# Patient Record
Sex: FEMALE | Race: WHITE | Employment: PART TIME | URBAN - METROPOLITAN AREA
[De-identification: names, ages, dates, MRNs, and addresses within clinical notes are randomized per-mention and may not be internally consistent; named-entity substitution may affect disease eponyms.]

---

## 2019-11-01 ENCOUNTER — HOSPITAL ENCOUNTER (EMERGENCY)
Facility: HOSPITAL | Age: 49
Discharge: HOME | End: 2019-11-01
Attending: EMERGENCY MEDICINE
Payer: MEDICAID

## 2019-11-01 ENCOUNTER — APPOINTMENT (OUTPATIENT)
Dept: GENERAL RADIOLOGY | Facility: HOSPITAL | Age: 49
End: 2019-11-01
Attending: EMERGENCY MEDICINE
Payer: MEDICAID

## 2019-11-01 VITALS
HEART RATE: 80 BPM | BODY MASS INDEX: 28.93 KG/M2 | WEIGHT: 180 LBS | OXYGEN SATURATION: 97 % | RESPIRATION RATE: 18 BRPM | TEMPERATURE: 98 F | HEIGHT: 66 IN | SYSTOLIC BLOOD PRESSURE: 108 MMHG | DIASTOLIC BLOOD PRESSURE: 66 MMHG

## 2019-11-01 DIAGNOSIS — M79.671 RIGHT FOOT PAIN: Primary | ICD-10-CM

## 2019-11-01 PROCEDURE — 73630 X-RAY EXAM OF FOOT: CPT | Performed by: EMERGENCY MEDICINE

## 2019-11-01 PROCEDURE — 99284 EMERGENCY DEPT VISIT MOD MDM: CPT

## 2019-11-01 PROCEDURE — 96372 THER/PROPH/DIAG INJ SC/IM: CPT

## 2019-11-01 RX ORDER — MELOXICAM 7.5 MG/1
7.5 TABLET ORAL DAILY PRN
Qty: 14 TABLET | Refills: 0 | Status: SHIPPED | OUTPATIENT
Start: 2019-11-01 | End: 2019-11-15

## 2019-11-01 RX ORDER — LIDOCAINE 50 MG/G
1 PATCH TOPICAL EVERY 24 HOURS
Qty: 7 PATCH | Refills: 0 | Status: SHIPPED | OUTPATIENT
Start: 2019-11-01 | End: 2019-11-08

## 2019-11-01 RX ORDER — KETOROLAC TROMETHAMINE 30 MG/ML
60 INJECTION, SOLUTION INTRAMUSCULAR; INTRAVENOUS ONCE
Status: COMPLETED | OUTPATIENT
Start: 2019-11-01 | End: 2019-11-01

## 2019-11-01 NOTE — ED INITIAL ASSESSMENT (HPI)
Pt c/o intermittent pain to top of right foot that is getting worst this morning, denies any trauma, no swelling/redness noted, cms intact

## 2019-11-01 NOTE — ED PROVIDER NOTES
Patient Seen in: [facility] AND [facility] Emergency Department    History   Patient presents with:   Foot Pain    Stated Complaint: pain to top of right foot     HPI    54-year-old female with past medical history of depression presenting for evaluation of approxi normal..  Musculoskeletal: No gross deformity. Right dorsal midfoot with mild tenderness without cutaneous/crepitant change and with soft compartments; RLE without ankle/calf tenderness or proximal extremity tenderness. Neurological: Alert.  RLE proximally Prescribed:  Discharge Medication List as of 11/1/2019  6:38 AM    START taking these medications    Meloxicam 7.5 MG Oral Tab  Take 1 tablet (7.5 mg total) by mouth daily as needed for Pain (Take with food.  Do not take with ibuprofen (motrin) or naproxen

## 2019-11-01 NOTE — ED NOTES
Patient c/o of right foot pain that has been intermittent for a year. Patient states the pain today was so bad it woke her up. Patient has a lump to anterior portion of foot.

## 2020-05-17 ENCOUNTER — APPOINTMENT (OUTPATIENT)
Dept: GENERAL RADIOLOGY | Facility: HOSPITAL | Age: 50
DRG: 917 | End: 2020-05-17
Payer: MEDICAID

## 2020-05-17 ENCOUNTER — APPOINTMENT (OUTPATIENT)
Dept: CT IMAGING | Facility: HOSPITAL | Age: 50
DRG: 917 | End: 2020-05-17
Attending: EMERGENCY MEDICINE
Payer: MEDICAID

## 2020-05-17 PROBLEM — F60.3 BORDERLINE PERSONALITY DISORDER (HCC): Status: ACTIVE | Noted: 2020-05-17

## 2020-05-17 PROBLEM — F31.4 SEVERE DEPRESSED BIPOLAR I DISORDER WITHOUT PSYCHOTIC FEATURES (HCC): Status: ACTIVE | Noted: 2020-05-17

## 2020-05-17 PROBLEM — I46.9 CARDIAC ARREST (HCC): Status: ACTIVE | Noted: 2020-05-17

## 2020-05-17 PROBLEM — T50.902A SUICIDAL OVERDOSE, INITIAL ENCOUNTER (HCC): Status: ACTIVE | Noted: 2020-05-17

## 2020-05-17 PROBLEM — J69.0: Status: ACTIVE | Noted: 2020-05-17

## 2020-05-17 PROBLEM — T40.1X1A ACCIDENTAL OVERDOSE OF HEROIN, INITIAL ENCOUNTER (HCC): Status: ACTIVE | Noted: 2020-05-17

## 2020-05-17 PROCEDURE — 70450 CT HEAD/BRAIN W/O DYE: CPT | Performed by: EMERGENCY MEDICINE

## 2020-05-17 PROCEDURE — 71260 CT THORAX DX C+: CPT | Performed by: EMERGENCY MEDICINE

## 2020-05-17 PROCEDURE — 71045 X-RAY EXAM CHEST 1 VIEW: CPT | Performed by: EMERGENCY MEDICINE

## 2020-05-17 NOTE — CONSULTS
Pulmonary/Critical Care Consultation Note    HPI:   Vicki Peralta is a 47year old female with Patient presents with:  Kyle Velazquez    Pt is a 46 yo with h/o depression who states was experimenting with nasal heroin and next she remembers in be TID  clonazePAM (KLONOPIN) tab 0.5 mg, 0.5 mg, Oral, BID PRN  haloperidol lactate (HALDOL) 5 MG/ML injection 1 mg, 1 mg, Intravenous, Q6H PRN  Heparin Sodium (Porcine) 5000 UNIT/ML injection 5,000 Units, 5,000 Units, Subcutaneous, Q8H [address]            Allerg neg  Plan CCU   Tele   ECHO    Pulm  Possible aspiration  Coughed up supraglottic device in route with EMS  CXR b/l GGO Upper > Lower  COVID rapid neg  BNP neg  Plan COVID PCR   Iv abx   F/u in AM    Heroin OD  States was experimenting  Tox + opiate and am

## 2020-05-17 NOTE — ED NOTES
Pt refused straight cath, put on bedpan, which spilled onto chucks. Only enough urine for UA, will notify floor RN still need tox screen. Pericare and bed change done. Pt crying asking to speak with a , Miladis Pichardo MD aware. Crisis called.

## 2020-05-17 NOTE — ED INITIAL ASSESSMENT (HPI)
rosc arrest. One round of cpr. 4 of narcan. Pt had emetic episode pta.  Pt is awake and durham upon arrival.

## 2020-05-17 NOTE — ED PROVIDER NOTES
Patient Seen in: [facility] AND [facility] Emergency Department      History   Patient presents with:  Eval-D    Stated Complaint: ROSC ARREST    HPI  54-year-old female with depression presenting for evaluation after cardiac arrest.  According to her , t HENT:      Head: Normocephalic and atraumatic. Neck:      Musculoskeletal: Normal range of motion and neck supple. No muscular tenderness. Cardiovascular:      Rate and Rhythm: Regular rhythm. Tachycardia present.       Heart sounds: Normal heart soun Notable for the following components:    POC Glucose  277 (*)     All other components within normal limits   CBC W/ DIFFERENTIAL - Abnormal; Notable for the following components:    WBC 13.4 (*)     Lymphocyte Absolute 4.70 (*)     All other components wi signed and verified by the Radiologist whose name is printed above. DD:  05/17/2020/DT:  05/17/2020      CT head without contrast  CT chest with IV contrast    IMPRESSION:    Head:  No acute intracranial abnormality.  No acute territorial infarct, hemorr spent performing procedures.     Admission disposition: 5/17/2020  5:01 AM           Disposition and Plan     Clinical Impression:  Accidental overdose of heroin, initial encounter ([facility] [address]  (primary encounter diagnosis)  Cardiac arrest ([facility] [address]  Aspiration pneum

## 2020-05-17 NOTE — PLAN OF CARE
Ronalmargocatarino was admitted this morning from the ER. She is A& O x4. Throughout the shift she has been very tearful and regretful regarding overdose. Breathing & relaxation techniques/education performed.  Pt has told RN that this was an accidental overdose and s and demonstrate effective coping strategies  Description  INTERVENTIONS:  - Assist patient/family to identify coping skills, available support systems and cultural and spiritual values  - Provide emotional support, including active listening and acknowledg about the behavioral management agreement  - Implement a Health Care Agreement if patient meets criteria  - If a patient’s behavior jeopardizes the safety of the patient, staff, or others refer to organization policy.  If a visitor’s behavior poses a threat community for further support work  Outcome: Progressing  Goal: Pt feels balance and connection with higher power that empowers the self during times of loss, guilt and fear  Description  INTERVENTIONS:  - Create safety for patient through empathic presenc including physical limitations  - Instruct pt to call for assistance with activity based on assessment  - Modify environment to reduce risk of injury  - Provide assistive devices as appropriate  - Consider OT/PT consult to assist with strengthening/mobilit oxygenation  Description  INTERVENTIONS:  - Assess for changes in respiratory status  - Assess for changes in mentation and behavior  - Position to facilitate oxygenation and minimize respiratory effort  - Oxygen supplementation based on oxygen saturation anticipated neutropenic period  Description  INTERVENTIONS  - Monitor WBC  - Administer growth factors as ordered  - Implement neutropenic guidelines  Outcome: Not Progressing

## 2020-05-17 NOTE — ED NOTES
Telephone call received from person identifying himself as the patient's , Shaheen Yassine, [phone], who states that the patient \"became very sleepy then unresponsive\" at 0135 am when he called 911 and was given instructions to perform chest com
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
## 2020-05-17 NOTE — CONSULTS
[facility] - [facility]     [facility] Cardiology Consult Note    Марина Munson Patient Status:  Emergency    1970 MRN [id number]   Location [address] Attending Emily Gaytan MD   Hosp Day # 0 PCP Aleisha Leon CBD, last heroin use prior to this was 7-8 yrs ago, no cocaine, no IVDU    Objective:   Pulse: 88  Resp: 21  BP: 102/67    Intake/Output:     Intake/Output Summary (Last 24 hours) at 5/17/2020 0738  Last data filed at 5/17/2020 9060  Gross per 24 hour   In overdose. Unlikely cardiac component. Initial rhythm PEA; no VT/VT. Trop negative, BNP normal, no ischemic changes on ECG.  Chest pain is MSK and completely reproducible on exam. SOB/hypoxia likely 2/2 aspiration.   - management of aspiration pneumonitis/pn

## 2020-05-17 NOTE — CM/SW NOTE
Received MDO for ETOH/Substance Abuse. Patient admitted following heroin overdose. Psychiatry follow, Psych liaison to assess & provide resources.     Bradley Taylor, [id number]

## 2020-05-17 NOTE — ED NOTES
Pt is intermittently sleeping, occasional cough. Protecting airway. lr infusing. Pt on 6l high-flow nc.

## 2020-05-18 ENCOUNTER — APPOINTMENT (OUTPATIENT)
Dept: CV DIAGNOSTICS | Facility: HOSPITAL | Age: 50
DRG: 917 | End: 2020-05-18
Attending: INTERNAL MEDICINE
Payer: MEDICAID

## 2020-05-18 ENCOUNTER — APPOINTMENT (OUTPATIENT)
Dept: GENERAL RADIOLOGY | Facility: HOSPITAL | Age: 50
DRG: 917 | End: 2020-05-18
Attending: INTERNAL MEDICINE
Payer: MEDICAID

## 2020-05-18 PROCEDURE — 93306 TTE W/DOPPLER COMPLETE: CPT | Performed by: INTERNAL MEDICINE

## 2020-05-18 PROCEDURE — 71045 X-RAY EXAM CHEST 1 VIEW: CPT | Performed by: INTERNAL MEDICINE

## 2020-05-18 NOTE — H&P
[facility]    PATIENT'S NAME: Anum Valencia   ATTENDING PHYSICIAN: Sherrell Joyce MD   PATIENT ACCOUNT#:   [id number]    LOCATION:  [address] [place] [address]. RECORD #:   [id number]       YOB: 1970  ADMISSION DATE:       05/17/20 normally. ABDOMEN:  Soft and nondistended. Bowel sounds present. EXTREMITIES:  No edema. LABORATORY DATA:  WBC 13.4, hemoglobin 12.4, platelets 573. BUN 11, creatinine 1.1, glucose 282. Urine drug screen showed amphetamines and opiates.     ASSESSME

## 2020-05-18 NOTE — PROGRESS NOTES
[address]  Antibiotic Dosing    Priscila Martinez is a 47year old female for whom pharmacy is dosing Zosyn for treatment of  pneumonia. Allergies: is allergic to lamictal [lamotrigine] and sulfa antibiotics.     Vitals: /70 (BP Loc

## 2020-05-18 NOTE — PROGRESS NOTES
Pulmonary/Critical Care Follow Up Note    HPI:   Oscar Mathews is a 47year old female with Patient presents with:  Eval-D      PCP Rivera Cotto  Admission Attending Garnett [place], [address] Day #1    [facility]  C/o CP  No sob  No fever 05/18/2020     05/18/2020    CA 8.0 05/18/2020    DDIMER 1.47 05/18/2020    CRP 6.89 05/18/2020           ASSESSMENT/PLAN:     PEA arrest  S/p heroin OD  S/p CPR and ROSC  Off pressors   Following commands  Trop neg  Plan      Tele                EC

## 2020-05-18 NOTE — PAYOR COMM NOTE
ADMITTED TO ICU    ADMISSION REVIEW     Payor: Chuy Freeman #:  [id number]  Authorization Number: [id number]    Admit date: 5/17/20  Admit time: [facility]       Admitting Physician: Erika Fuller MD  Attending Physician:  Reynaldo Liu ARREST  Other systems are as noted in HPI. Constitutional and vital signs reviewed. All other systems reviewed and negative except as noted above.     Physical Exam     ED Triage Vitals [05/17/20 0223]   /89   Pulse 111   Resp 17   Temp    Temp (*)     GFR, Non- 54 (*)     AST 64 (*)     Alkaline Phosphatase 126 (*)     A/G Ratio 0.8 (*)     All other components within normal limits   ACETAMINOPHEN (TYLENOL), S - Abnormal; Notable for the following components:    Acetaminophen <2. block present                  Comparison July 25, 2011    AP chest x-ray at 0407    IMPRESSION:    Moderate pulmonary venous congestion with bilateral interstitial prominence, suggestive of pulmonary edema.   Cannot exclude an atypical infectious/inflammat 95%.  EKG does not reveal STEMI, there are nonspecific ST depressions. Troponin within normal limits. Zosyn ordered for empiric coverage of presumed aspiration pneumonia. IV maintenance fluids ordered, will keep patient NPO in the ED.     Case d/w Dr. Justice Chisholm after a PEA arrest for evaluation. According to the , the patient became tired and unresponsive at approximately 1:30 a.m. The  called 46 and was instructed to check for pulse and also started CPR.   Upon arrival by the EMS, initially was f aspiration pneumonia. Started on IV Zosyn. 3.   Positive urine drug screen. 4.   History of depression and anxiety disorder. 5.   DVT prophylaxis. Heparin subcutaneous q.12 h.      6.   Nutrition. On general diet.     Dictated By Sobeida Garrett, King Peter RN      0.9% NaCl infusion     Date Action Dose Route User    5/18/2020 0550 New Bag (none) Intravenous Neil Mercer RN    5/17/2020 3462 New Bag (none) Intravenous Neil Mercer RN    5/17/2020 1502 New Bag (none) Intravenous Lisa Green arousable after just recently receiving klonopin.      Objective:   Temp: 97.2 °F (36.2 °C)  Pulse: 79  Resp: 17  BP: 122/73     Intake/Output:      Intake/Output Summary (Last 24 hours) at 5/18/2020 0942  Last data filed at 5/18/2020 0531      Gross per 2 that can be seen with gastroesophageal reflux disease.     EKG: sinus tachycardia, incomplete RBBB, nonspecific ST changes     TTE: pending        Assessment and Plan:  46 y/o F with pmh depression, tob use here with out of hospital PEA arrest due to heroi tolerated               follow     Heroin OD  States was experimenting  Tox + opiate and amphetamines  + tob  + ETOH  Plan    psych following for psychotherapy                Add Abilify                Prn klonopin                Cont gabapentin     Tob ab

## 2020-05-18 NOTE — PLAN OF CARE
Pt transferred from CCU. Pt c/o headache and pain to chest r/t CPR. Ice packs and pain meds prn. Pt is drowsy & anxious at times. Psych meds adjusted per Dr. Thais King. P&C and sitter maintained at bedside for continuous supervision.  Denies suicidal thoughts values  - Provide emotional support, including active listening and acknowledgement of concerns of patient and caregivers  - Reduce environmental stimuli, as able  - Instruct patient/family in relaxation techniques, as appropriate  - Assess for spiritual a or others refer to organization policy.  If a visitor’s behavior poses a threat to safety call refer to organization policy.  - Initiate consult with , Psychosocial CNS, Spiritual Care as appropriate  Outcome: Progressing     Problem: SELF HARM feelings onto other individuals  - Invite use of sacraments/rituals/ceremonies as appropriate (e.g. - confession, anointing, smudging)  -.  Refer patient to formal counseling and/or to [place] community for further support work  Outcome: Progressing  Goal: Pt period  Description  INTERVENTIONS  - Monitor WBC  - Administer growth factors as ordered  - Implement neutropenic guidelines  Outcome: Progressing     Problem: SAFETY ADULT - FALL  Goal: Free from fall injury  Description  INTERVENTIONS:  - Assess pt freq visual cues when possible  - Listen attentively, be patient, do not interrupt  - Minimize distractions  - Allow time for understanding and response  - Establish method for patient to ask for assistance (call light)  - Provide an  as needed  - Co

## 2020-05-18 NOTE — PLAN OF CARE
Sitter/suicide precautions maintained. She has verbalized feeling depressed, anxious, unhappy about what happened that brought her to the hospital. Clonopin given for anxiety this morning.  Toradol given for c/o chest pain with some but not complete relief identify pros/cons of alternative solutions  - Provide information as requested by patient/family  - Respect patient/family right to receive or not to receive information  - Serve as a liaison between patient and family and health care team  - Initiate Con goal  Description  INTERVENTIONS:  - Encourage pt to monitor pain and request assistance  - Assess pain using appropriate pain scale  - Administer analgesics based on type and severity of pain and evaluate response  - Implement non-pharmacological measures response  - Establish method for patient to ask for assistance (call light)  - Provide an  as needed  - Communicate barriers and strategies to overcome with those who interact with patient  Outcome: Progressing     Problem: RESPIRATORY - ADULT acknowledgement of concerns of patient and caregivers  - Reduce environmental stimuli, as able  - Instruct patient/family in relaxation techniques, as appropriate  - Assess for spiritual and psychosocial needs and initiate Spiritual Care or Behavioral Heal guilt and fear  Description  INTERVENTIONS:  - Create safety for patient through empathic presence and non-judgmental listening  - Encourage patient to explore his/her values, beliefs and/or spiritual images and practices  - Encourage use of breath work, i

## 2020-05-18 NOTE — PLAN OF CARE
Problem: Patient Centered Care  Goal: Patient preferences are identified and integrated in the patient's plan of care  Description  Interventions:  - What would you like us to know as we care for you?  Pt from home  - Provide timely, complete, and accurat alternatives and participate in decision making related to treatment and care  Description  INTERVENTIONS:  - Determine when there are differences between patient's view, family's view, and healthcare provider's view of condition  - Facilitate patient and RN  - Initiate consults as appropriate with Behavioral Health, Spiritual Care  - Evaluate care setting prior to admitting patient removing all contraband  - Remove all personal property per policy  Outcome: Progressing     Problem: DRUG ABUSE/DETOX  Goal: presence and non-judgmental listening  - Encourage patient to explore his/her values, beliefs and/or spiritual images and practices  - Encourage use of breath work, imagery, meditation, relaxation, reiki to ease distress and provide healing  - Encourage us including physical limitations  - Instruct pt to call for assistance with activity based on assessment  - Modify environment to reduce risk of injury  - Provide assistive devices as appropriate  - Consider OT/PT consult to assist with strengthening/mobilit oxygenation  Description  INTERVENTIONS:  - Assess for changes in respiratory status  - Assess for changes in mentation and behavior  - Position to facilitate oxygenation and minimize respiratory effort  - Oxygen supplementation based on oxygen saturation

## 2020-05-18 NOTE — PROGRESS NOTES
Rachael Tesfaye made introductory visit.  Patient resting in room  O- patient spoke of her concern for her son and her sadness   about life in general. Prayer was requested  A-  offered emotional support, empathic listening, and prayer  P- Follow-up is
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
## 2020-05-18 NOTE — H&P
[place] [facility] - [facility]    History & Physical    Adrienne Comp Patient Status:  Inpatient    1970 MRN [id number]   [facility] 2W/SW Attending [facility] Day # 1 PCP Guerline Bruce     Date:  2020  Date Oral Tab, Take 1 mg by mouth nightly as needed for Anxiety.         Review of Systems:   Constitutional: negative except for malaise  Eyes: negative   Ears, nose, mouth, throat, and face: negative except for   Respiratory:  cardiovascularascular: chest pain as well as acute nondisplaced left anterior 4th and 5th rib fractures. Dictated by (CST): Obi Sousa MD on 5/17/2020 at 8:19 AM     Finalized by (CST): Obi Sousa MD on 5/17/2020 at 8:19 AM             Result Date: 5/17/2020  CONCLUSION:  1.  Ext -------------------------- Sinus Tachycardia -RSR(V1).  -Left atrial enlargement.  -Nonspecific ST changes -Nondiagnostic.  ABNORMAL No previous ECGs available Electronically signed on 05/17/2020 at 18:26 by Audrey Gonsales MD      Assessment/Plan:     Accide

## 2020-05-18 NOTE — PROGRESS NOTES
[facility]     [facility] Cardiology Progress Note    Nonnie Section Patient Status:  Inpatient    1970 MRN [id number]   [facility] 2W/SW [facility] Day [facility] 1 PCP Mandy Goss     Subject 168 hours. Lab Results   Component Value Date    INR 1.10 05/17/2020       Studies:  CT chest:  1. Extensive bilateral mixed consolidative and ground-glass density airspace disease with a dependent predominance.   Mild bibasilar interlobular septal thicken

## 2020-05-18 NOTE — PLAN OF CARE
Double RN skin check done prior to transfer off Unit. Skin check performed by this RN and Pao, PCT. Wounds are as follows: pinpoint scabbed area to L upper shin, open to air. Will remain available for any further questions or concerns.

## 2020-05-19 ENCOUNTER — APPOINTMENT (OUTPATIENT)
Dept: GENERAL RADIOLOGY | Facility: HOSPITAL | Age: 50
DRG: 917 | End: 2020-05-19
Attending: INTERNAL MEDICINE
Payer: MEDICAID

## 2020-05-19 PROCEDURE — 71045 X-RAY EXAM CHEST 1 VIEW: CPT | Performed by: INTERNAL MEDICINE

## 2020-05-19 NOTE — PROGRESS NOTES
[place] [facility] - [facility]    Progress Note    Sheri Peng Patient Status:  Inpatient    1970 MRN [id number]   Location [facility] 3W/SW Attending [address], [address] Day # 2 PCP Dona Felton       Subjective:   Eli Shanks ADD-vantage, 3.375 g, Intravenous, Q8H  ipratropium-albuterol (DUONEB) nebulizer solution 3 mL, 3 mL, Nebulization, Q6H PRN  ARIPiprazole (ABILIFY) tab 2 mg, 2 mg, Oral, BID  clonazePAM (KLONOPIN) tab 0.5 mg, 0.5 mg, Oral, BID PRN  Heparin Sodium (Porcine) pneumonia   ?  Fluid overload   D/c IV fluid   Chest xray   Continue Iv Abx     Elevated D Dimer   Chest CT showed aspiration pneumonia   No evidence of PE     Chest pain   Due to multiple rib fractures   Ibuprofen prn       Suicidal overdose, initial encou

## 2020-05-19 NOTE — PROGRESS NOTES
[place] [facility] - [facility]    Progress Note    Cary Herrera Patient Status:  Inpatient    1970 MRN [id number]   Location [facility] 3W/SW Attending [address], [address] Day # 1 PCP Melony Valle       Subjective:   Eli Con [address]  0.9% NaCl infusion, , Intravenous, Continuous          Results:     Lab Results   Component Value Date    WBC 8.5 05/18/2020    HGB 9.8 (L) 05/18/2020    HCT 29.9 (L) 05/18/2020    .0 05/18/2020    CREATSERUM 0.62 05/18/2020    BUN 9 05/18/2020 preliminary report was issued by the [address] [place] [address] Radiology teleradiology service. There are no major discrepancies.   Remote - PS  Dictated by (CST): Monet Balderrama MD on 5/17/2020 at 7:03 AM     Finalized by (CST): Monet Balderrama MD on 5/17/2020 at 7:05 AM encounter ([facility] [address]    Severe depressed bipolar I disorder without psychotic features ([facility] [address]  [facility]line [facility]  Rec per psych   continue sitter     DVT prophylaxis   On heparin SQ           Marino Leon MD  5/18/2020

## 2020-05-19 NOTE — PROGRESS NOTES
[facility]     [facility]HS/[facility] Cardiology Progress Note    Jessie Huynh Patient Status:  Inpatient    1970 MRN [id number]   Location [facility] 3W/SW Attending [address], [address] Day # 2 PCP Nadia Chen     Subject Studies:  CXR: Worsening diffuse bilateral pulmonary opacification. CT chest:  1.  Extensive bilateral mixed consolidative and ground-glass density airspace disease with a dependent predominance.  Mild bibasilar interlobular septal thickening.  Th

## 2020-05-19 NOTE — PLAN OF CARE
Patient P&C'd, sitter at bedside throughout the night. Patient with c/o of anxiety and agitation overnight. PRN klonopin and haldol given. Patient with c/o of SOB, PRN inhaler given and PRN neb treatment added. O2 increased to 6L.  Tylenol given for headach emotional support, including active listening and acknowledgement of concerns of patient and caregivers  - Reduce environmental stimuli, as able  - Instruct patient/family in relaxation techniques, as appropriate  - Assess for spiritual and psychosocial ne organization policy.  If a visitor’s behavior poses a threat to safety call refer to organization policy.  - Initiate consult with , Psychosocial CNS, Spiritual Care as appropriate  Outcome: Progressing     Problem: SELF HARM  Goal: Patient jennifer individuals  - Invite use of sacraments/rituals/ceremonies as appropriate (e.g. - confession, anointing, smudging)  -.  Refer patient to formal counseling and/or to [place] community for further support work  Outcome: Progressing  Goal: Pt feels balance and c WBC  - Administer growth factors as ordered  - Implement neutropenic guidelines  Outcome: Progressing     Problem: SAFETY ADULT - FALL  Goal: Free from fall injury  Description  INTERVENTIONS:  - Assess pt frequently for physical needs  - Identify cognitiv attentively, be patient, do not interrupt  - Minimize distractions  - Allow time for understanding and response  - Establish method for patient to ask for assistance (call light)  - Provide an  as needed  - Communicate barriers and strategies to

## 2020-05-19 NOTE — PROGRESS NOTES
Pulmonary/Critical Care Follow Up Note    HPI:   Adi Parekh is a 47year old female with Patient presents with:  Eval-D      PCP Glenn Parents  Admission Attending Yoni Cheema MD    Hospital Day #2    Distraught (still)  C/o SOB   C/o cou [address] [place] [employer] filed at 5/19/2020 1226  Gross per 24 hour   Intake 3540 ml   Output 4700 ml   Net -1160 ml     distrought  A/O x 3  Lung few crackles  cv reg tachy  abd soft +bs  Ext no edema      LABS:    Lab Results   Component Value Date    WBC 8.3 05/

## 2020-05-19 NOTE — PLAN OF CARE
Pt anxious/agitated in bed, c/o SOB today. Chest x-ray showed worsening-- IV fluids stopped, IV lasix given. Good UOP. Ambulated to BR. Pt c/o depression and sadness, denies suicidal thoughts. On 6L of O2.  Pt reports breathing has improved after nebulizer patient/family in relaxation techniques, as appropriate  - Assess for spiritual and psychosocial needs and initiate Spiritual Care or Behavioral Health consult as needed  Outcome: Progressing     Problem: DECISION MAKING  Goal: Pt/Family able to effectivel Psychosocial CNS, Spiritual Care as appropriate  Outcome: Progressing     Problem: SELF HARM  Goal: Patient will be protected from self-harm  Description  INTERVENTIONS:  - Initiate Suicide Precautions, including constant direct observation by a care joseph counseling and/or to [place] community for further support work  Outcome: Progressing  Goal: Pt feels balance and connection with higher power that empowers the self during times of loss, guilt and fear  Description  INTERVENTIONS:  - Create safety for patie Free from fall injury  Description  INTERVENTIONS:  - Assess pt frequently for physical needs  - Identify cognitive and physical deficits and behaviors that affect risk of falls.   - [place] fall precautions as indicated by assessment.  - Educate pt/famil for assistance (call light)  - Provide an  as needed  - Communicate barriers and strategies to overcome with those who interact with patient  Outcome: Progressing     Problem: ANXIETY  Goal: Will report anxiety at manageable levels  Description

## 2020-05-20 NOTE — PROGRESS NOTES
Rigoberto Mccurdy is a 47year old   female lives with a friend with history of depression, borderline personality, ADHD and OCD who brought to the emergency room after patient found unresponsive by her ex-.   The patient seen today via status: Current Every Day Smoker        Packs/day: 0.00      Smokeless tobacco: Current User    Alcohol use: Not on file    Drug use: Not on file       Medications (Active prior to today's visit):  haloperidol lactate (HALDOL) 5 MG/ML injection 2 mg, 2 mg, 14.0 05/17/2020    DDIMER 1.47 (H) 05/18/2020    CRP 6.89 (H) 05/18/2020    TROP <0.045 05/17/2020    ETOH <3 05/17/2020         Imaging  Ct Chest(contrast Only) (cpt=71260)    Addendum Date: 5/17/2020    ADDENDUM:  Also noted are acute mildly displaced le Portable  (cpt=71045)    Result Date: 5/18/2020  CONCLUSION:  1. Significant clearing of the bilateral infiltrates/consolidation more so on the left than the right when compared to May 17, 2020. Significant residual remains on the right.     Dictated by (CS feature.   Borderline personality disorder        The patient with history of mood disorder and multiple psychiatric admission who has been going through severe depressive episode recently and having suicidal ideation and then overdose on heroin without ful Protein      CBC With Differential With Platelet      Basic Metabolic Panel (8)      Potassium      Pro Beta Natriuretic Peptide      Potassium      Rapid SARS-CoV-2 by PCR STAT      Emergency MRSA Screen by PCR STAT      Urine Culture, Routine Once      S

## 2020-05-20 NOTE — PHYSICAL THERAPY NOTE
PHYSICAL THERAPY EVALUATION - INPATIENT     Room Number: 214/622-G  Evaluation Date: 5/20/2020  Type of Evaluation: Initial   Physician Order: PT Eval and Treat    Presenting Problem: accidental overdose of heroin  Reason for Therapy: Mobility Dysfunction of heroin, initial encounter [facility])  Active Problems:    Cardiac arrest ([facility] [address]    Aspiration pneumonia of both lungs due to vomit, unspecified part of lung ([address]    Suicidal overdose, initial encounter ([facility] [address]    Severe depressed bipolar I disorder without psy a chair (including a wheelchair)?: None   -   Need to walk in hospital room?: A Little   -   Climbing 3-5 steps with a railing?: A Little     AM-PAC Score:  Raw Score: 22   Approx Degree of Impairment: 20.91%   Standardized Score (AM-PAC Scale): 53.28   CM

## 2020-05-20 NOTE — PLAN OF CARE
Pt is alert and oriented. Anxious last night. PRN sedatives given 1 time last night. Pt able to follow commands. Sitter at bedside last night. Pt reported she had a good night sleep. Up with 1 assist. Pt does not like to use the call light system.  Will con concerns of patient and caregivers  - Reduce environmental stimuli, as able  - Instruct patient/family in relaxation techniques, as appropriate  - Assess for spiritual and psychosocial needs and initiate Spiritual Care or Behavioral Health consult as neede safety call refer to organization policy.  - Initiate consult with , Psychosocial CNS, Spiritual Care as appropriate  Outcome: Progressing     Problem: SELF HARM  Goal: Patient will be protected from self-harm  Description  INTERVENTIONS:  - I appropriate (e.g. - confession, anointing, smudging)  -.  Refer patient to formal counseling and/or to [place] community for further support work  Outcome: Progressing  Goal: Pt feels balance and connection with higher power that empowers the self during time neutropenic guidelines  Outcome: Progressing     Problem: SAFETY ADULT - FALL  Goal: Free from fall injury  Description  INTERVENTIONS:  - Assess pt frequently for physical needs  - Identify cognitive and physical deficits and behaviors that affect risk of applicable  - Encourage broncho-pulmonary hygiene including cough, deep breathe, Incentive Spirometry  - Assess the need for suctioning and perform as needed  - Assess and instruct to report SOB or any respiratory difficulty  - Respiratory Therapy support

## 2020-05-20 NOTE — PROGRESS NOTES
[facility]     MHS/AMG Cardiology Progress Note    Vacayumiko Hartmann Patient Status:  Inpatient    1970 MRN [id number]   [facility] 3W[facility]SW [facility] PCP Terry Gary --   --   --    BILT 0.2  --   --   --   --    TP 7.6  --   --   --   --         Recent Labs   Lab 05/17/20  0227   TROP <0.045     No results for input(s): BNP in the last 168 hours.   Lab Results   Component Value Date    INR 1.10 05/17/2020       Studies fractures:  - pan control     MD CRYSTAL Gutierrez/[facility] Cardiology  5/20/2020  8:38 AM

## 2020-05-20 NOTE — PROGRESS NOTES
Pulmonary/Critical Care Follow Up Note    HPI:   Liz Kirkland is a 47year old female with Patient presents with:  Eval-D      PCP Para No  Admission Attending Blaise Mar MD    Hospital Day #3    Less distraught  Less SOB   less coug 24 hours) at 5/20/2020 1715  Last data filed at 5/20/2020 1702  Gross per 24 hour   Intake 2176 ml   Output 4375 ml   Net -2199 ml       A/O x 3  Lung few crackles  CV  reg tachy  abd soft +bs  Ext no edema      LABS:    Lab Results   Component Value Date

## 2020-05-20 NOTE — PLAN OF CARE
Problem: Patient Centered Care  Goal: Patient preferences are identified and integrated in the patient's plan of care  Description  Interventions:  - What would you like us to know as we care for you?  I have a son named irena  - Provide timely, complete patient/family  - Respect patient/family right to receive or not to receive information  - Serve as a liaison between patient and family and health care team  - Initiate Consults from Ethics, [address] or initiate [facility] as is appropr spiritual beliefs, cultural understandings or values that may help or hinder letting go of issue  - Help patient explore feelings of anger, bitterness, resentment  - Help patient/family identify and examine the situation in which these feelings are experie opioid side effects  - Notify MD/LIP if interventions unsuccessful or patient reports new pain  - Anticipate increased pain with activity and pre-medicate as appropriate  Outcome: Progressing     Problem: SAFETY ADULT - FALL  Goal: Free from fall injury  D

## 2020-05-20 NOTE — PROGRESS NOTES
[facility] - [facility]    Progress Note    Sheri Peng Patient Status:  Inpatient    1970 MRN [id number]   Location [facility] 3W/SW Attending Kenya Aviles [facility] Day # 3 PCP Dona Felton       Subjective:   Eli Con Nebulization, Q6H PRN  ARIPiprazole (ABILIFY) tab 2 mg, 2 mg, Oral, BID  clonazePAM (KLONOPIN) tab 0.5 mg, 0.5 mg, Oral, BID PRN  Heparin Sodium (Porcine) 5000 UNIT/ML injection 5,000 Units, 5,000 Units, Subcutaneous, Q8H [address]          Results:     Lab Resu staff       Remedios Jennings MD

## 2020-05-21 NOTE — CM/SW NOTE
Received MDO for Finances. Pt's RN also called SW and asked about resources for pt's son due to him finding pt after overdose. SW consulted w/ psyche liaison/Leslie Nelson confirmed she will provide resources for pt's son to help cope w/ trauma.     Jesse Larios

## 2020-05-21 NOTE — PROGRESS NOTES
Pulmonary/Critical Care Follow Up Note    HPI:   Jessie Huynh is a 47year old female with Patient presents with:  Eval-D      PCP Marbinll Shed  Admission Attending Ora Wang 15 Day #4    Feeling better  No  SOB at rest   mil resp. rate 18, height 5' 4\" (1.626 m), weight 188 lb 4.8 oz (85.4 kg), last menstrual period 05/13/2020, SpO2 94 %.     Intake/Output Summary (Last 24 hours) at 5/21/2020 1344  Last data filed at 5/21/2020 1245  Gross per 24 hour   Intake 2260 ml   Output

## 2020-05-21 NOTE — PROGRESS NOTES
Consuelo Hansen is a 47year old   female lives with a friend with history of depression, borderline personality, ADHD and OCD who brought to the emergency room after patient found unresponsive by her ex-.   The patient seen today via Smoking status: Current Every Day Smoker        Packs/day: 0.00      Smokeless tobacco: Current User    Alcohol use: Not on file    Drug use: Not on file       Medications (Active prior to today's visit):  nicotine (NICODERM CQ) 21 MG/24HR 1 patch, 1 pat 05/20/2020    ALT 26 05/20/2020    PTT 27.3 05/17/2020    INR 1.10 05/17/2020    PTP 14.0 05/17/2020    DDIMER 1.47 (H) 05/18/2020    CRP 6.89 (H) 05/18/2020    TROP <0.045 05/17/2020    ETOH <3 05/17/2020         Imaging  Xr Chest Ap Portable  (cpt=11662) recent episode depression, severe without psychotic feature.   Borderline personality disorder        The patient with history of mood disorder and multiple psychiatric admission who has been going through severe depressive episode recently and having suici LDH      D-Dimer      Ferritin      C-Reactive Protein      CBC With Differential With Platelet      Basic Metabolic Panel (8)      Potassium      Pro Beta Natriuretic Peptide      Potassium      Comp Metabolic Panel (14)      Rapid SARS-CoV-2 by PCR STAT

## 2020-05-21 NOTE — PROGRESS NOTES
[facility] - [facility]     MHS/AMG Cardiology Progress Note    Bernice Oreilly Patient Status:  Inpatient    1970 MRN [id number]   Location [facility] 3W[facility]SW [facility] Day # 4 PCP Shruthi Negron     Subject 28.0 26.0  --  29.0   ALKPHO 126*  --   --   --  89   AST 64*  --   --   --  30   ALT 48  --   --   --  26   BILT 0.2  --   --   --  0.8   TP 7.6  --   --   --  7.2        Recent Labs   Lab 05/17/20 0227   TROP <0.045     No results for input(s): BNP in t cont abx  - mild residual volume overload on exam today. Give additional dose of IV lasix today.  Recommend outpt stress testing.      Chest pain 2/2 rib fractures:  - pain control    Cristian Posadas MD  [facility] Cardiology  5/21/2020  9:06 AM

## 2020-05-21 NOTE — BH PROGRESS NOTE
Psych Liaison updated Eli's petition and certificate in her paper chart and send secure [facility] chat to Dr. Alvin Cheng notifying her of updated certificate for her to sign.      Vladislav Parra [address] [facility] [id number]

## 2020-05-21 NOTE — PAYOR COMM NOTE
--------------  CONTINUED STAY REVIEW    Payor: Rita Weston #:  [id number]  Authorization Number: [id number]    Admit date: 5/17/20  Admit time: [facility]    Admitting Physician: Kim Harrell MD  Attending Physician:  Sonja Schmitt AARON Mobley      furosemide (LASIX) injection 20 mg     Date Action Dose Route User    5/21/2020 1132 Given 20 mg Intravenous Billie Forrester RN      gabapentin (NEURONTIN) cap 300 mg     Date Action Dose Route User    5/21/2020 0855 Given 300 mg Oral Florence Jenkins RN      QUEtiapine Fumarate ER (SEROQUEL XR) 24-hr tab 150 mg     Date Action Dose Route User    5/20/2020 1728 Given 150 mg Oral [place]

## 2020-05-21 NOTE — PROGRESS NOTES
S-  made follow-up visit. Patient was anxious and sitting in room  O- Patient spoke about being scared for her son's well-being. Patient   requested help in contacting the [facility] police for a wellness check.     touched base with nurse

## 2020-05-21 NOTE — PLAN OF CARE
Pt is alert and oriented. 3L nasal cannula. Sitter at bedside. PRN ativan and haldol given. IV abx. Inpatient psych when discharge. Call light within reach. Will continue to monitor pt.    Problem: Patient Centered Care  Goal: Patient preferences are identi and caregivers  - Reduce environmental stimuli, as able  - Instruct patient/family in relaxation techniques, as appropriate  - Assess for spiritual and psychosocial needs and initiate Spiritual Care or Behavioral Health consult as needed  Outcome: Progress organization policy.  - Initiate consult with , Psychosocial CNS, Spiritual Care as appropriate  Outcome: Progressing     Problem: SELF HARM  Goal: Patient will be protected from self-harm  Description  INTERVENTIONS:  - [address] confession, anointing, smudging)  -.  Refer patient to formal counseling and/or to [place] community for further support work  Outcome: Progressing  Goal: Pt feels balance and connection with higher power that empowers the self during times of loss, guilt and guidelines  Outcome: Progressing     Problem: SAFETY ADULT - FALL  Goal: Free from fall injury  Description  INTERVENTIONS:  - Assess pt frequently for physical needs  - Identify cognitive and physical deficits and behaviors that affect risk of falls.   - I Manage/alleviate anxiety  - Monitor for signs/symptoms of CO2 retention  Outcome: Progressing     Problem: DISCHARGE PLANNING  Goal: Discharge to home or other facility with appropriate resources  Description  INTERVENTIONS:  - Identify barriers to dischar

## 2020-05-22 ENCOUNTER — APPOINTMENT (OUTPATIENT)
Dept: CV DIAGNOSTICS | Facility: HOSPITAL | Age: 50
DRG: 917 | End: 2020-05-22
Attending: INTERNAL MEDICINE
Payer: MEDICAID

## 2020-05-22 ENCOUNTER — APPOINTMENT (OUTPATIENT)
Dept: NUCLEAR MEDICINE | Facility: HOSPITAL | Age: 50
DRG: 917 | End: 2020-05-22
Attending: INTERNAL MEDICINE
Payer: MEDICAID

## 2020-05-22 PROCEDURE — 78452 HT MUSCLE IMAGE SPECT MULT: CPT | Performed by: INTERNAL MEDICINE

## 2020-05-22 PROCEDURE — 93017 CV STRESS TEST TRACING ONLY: CPT | Performed by: INTERNAL MEDICINE

## 2020-05-22 NOTE — PROGRESS NOTES
[facility]    Progress Note    Regina Madrid Patient Status:  Inpatient    1970 MRN [id number]   Location [facility] 3W/SW [facility] PCP Prasad Bull       Subjective:   Eli Con Q8H  ipratropium-albuterol (DUONEB) nebulizer solution 3 mL, 3 mL, Nebulization, Q6H PRN  ARIPiprazole (ABILIFY) tab 2 mg, 2 mg, Oral, BID  clonazePAM (KLONOPIN) tab 0.5 mg, 0.5 mg, Oral, BID PRN  Heparin Sodium (Porcine) 5000 UNIT/ML injection 5,000 Units

## 2020-05-22 NOTE — OCCUPATIONAL THERAPY NOTE
OCCUPATIONAL THERAPY EVALUATION - INPATIENT     Room Number: 802/304-G  Evaluation Date: 5/22/2020  Type of Evaluation: Initial  Presenting Problem: (Accidental heroine overdose)    Physician Order: IP Consult to Occupational Therapy  Reason for Therapy: History:   Diagnosis Date   • Depression    • Migraine        Past Surgical History  History reviewed. No pertinent surgical history.     HOME SITUATION  Type of Home: House  Home Layout: Two level  Lives With: Friend(s)               Drives: Yes  Patient R indep  Bathing: indep  Toileting: indep  Upper Extremity Dressing: indep  Lower Extremity Dressing: indep    Education Provided: Educated patient in role of OT and POC  Patient End of Session: Up in chair; With [facility] staff;Needs met;Call light within reach;RN

## 2020-05-22 NOTE — PAYOR COMM NOTE
--------------  CONTINUED STAY REVIEW   5-22-20     Payor: Kevan Gupta #:  [id number]  Authorization Number: [id number]    Admit date: 5/17/20  Admit time: [facility]    Admitting Physician: Linda Whitley MD  Attending Physician:  Jesus S Mindy Salvador 5/21/2020 1535 Given 300 mg Oral Lulu Rudd RN      haloperidol lactate (HALDOL) 5 MG/ML injection 2 mg     Date Action Dose Route User    5/22/2020 0031 Given 2 mg Intramuscular (Right Deltoid) Iman Lewis RN      Heparin Sodium (Porcine) 5

## 2020-05-22 NOTE — BH PROGRESS NOTE
Psych Liaison updated Eli's petition in paper chart. Petition and Certificate that were completed today are valid through Monday and will not need to be updated again until Monday.     Psych Liaison made copy for weekend team in the event she is medical

## 2020-05-22 NOTE — PHYSICAL THERAPY NOTE
PHYSICAL THERAPY TREATMENT NOTE - INPATIENT     Room Number: 298/964-A       Presenting Problem: accidental overdose of heroin    Problem List  Principal Problem:    Accidental overdose of heroin, initial encounter [facility])  Active Problems:    Cardiac arrest BEARING RESTRICTION  Weight Bearing Restriction: None                PAIN ASSESSMENT   Ratin          BALANCE                                                                                                                     Static Sitting: Good  Lindsey met    Goal #4 Patient will negotiate 10  stairs/one curb w/ assistive device and supervision   Goal #4   Current Status 6 stairs SBA for hospital setting, okay for home   Goal #5 Patient to demonstrate independence with home activity/exercise instructions

## 2020-05-22 NOTE — PLAN OF CARE
Pt remains on suicide precautions, sitter at bedside. On IV zosyn for arpiration PNA. PRN meds given for anxiety. Tylenol given for rib pain r/t compressions. Plan for inpt psych when medically cleared for transfer.     Problem: Patient Centered Care  G stimuli, as able  - Instruct patient/family in relaxation techniques, as appropriate  - Assess for spiritual and psychosocial needs and initiate Spiritual Care or Behavioral Health consult as needed  Outcome: Progressing     Problem: DECISION MAKING  Goal: with , Psychosocial CNS, Spiritual Care as appropriate  Outcome: Progressing     Problem: SELF HARM  Goal: Patient will be protected from self-harm  Description  INTERVENTIONS:  - Initiate Suicide Precautions, including constant direct observa patient to formal counseling and/or to [place] community for further support work  Outcome: Progressing  Goal: Pt feels balance and connection with higher power that empowers the self during times of loss, guilt and fear  Description  INTERVENTIONS:  - Creat - FALL  Goal: Free from fall injury  Description  INTERVENTIONS:  - Assess pt frequently for physical needs  - Identify cognitive and physical deficits and behaviors that affect risk of falls. - [place] fall precautions as indicated by assessment.   - Ed patient to ask for assistance (call light)  - Provide an  as needed  - Communicate barriers and strategies to overcome with those who interact with patient  Outcome: Progressing     Problem: RESPIRATORY - ADULT  Goal: Achieves optimal ventilatio

## 2020-05-22 NOTE — PLAN OF CARE
Problem: Patient Centered Care  Goal: Patient preferences are identified and integrated in the patient's plan of care  Description  Interventions:  - What would you like us to know as we care for you?  I have a son named irena  - Provide timely, complete Narendra Landry RN  Outcome: Progressing    Problem: BEHAVIOR  Goal: Pt/Family maintain appropriate behavior and adhere to behavioral management agreement, if implemented  Description  INTERVENTIONS:  - Assess patient/family’s coping skills and  non-compl 1913 by Curt Johnson RN  Outcome: Progressing     Problem: SPIRITUAL CARE  Goal: Pt/Family able to move forward in process of forgiving self, others and/or higher power  Description  INTERVENTIONS:  - Assist patient to overcome blocks to healing by use [facility] for assistance, as appropriate  - Respond to patient/family need for prayer/ritual/sacrament/ceremony  5/21/2020 1913 by Pj Mahan RN  Outcome: Progressing  5/21/2020 1856 by Pj Mahan, RN  Outcome: Progressing     Problem: PAIN - AD assist with strengthening/mobility  - Encourage toileting schedule  5/21/2020 1913 by Shweta Rosales RN  Outcome: Progressing    Problem: DISCHARGE PLANNING  Goal: Discharge to home or other facility with appropriate resources  Description  INTERVENTIONS Position to facilitate oxygenation and minimize respiratory effort  - Oxygen supplementation based on oxygen saturation or ABGs  - Provide Smoking Cessation handout, if applicable  - Encourage broncho-pulmonary hygiene including cough, deep breathe, Incent

## 2020-05-22 NOTE — PROGRESS NOTES
Pulmonary/Critical Care Follow Up Note    HPI:   Rigoberto Mccurdy is a 47year old female with Patient presents with:  Eval-D      PCP Martha Glynn  Admission Attending Jomar Del [facility] Day #5    No sob  No cough  Noted srtess test w %.    Intake/Output Summary (Last 24 hours) at 5/22/2020 1532  Last data filed at 5/22/2020 1429  Gross per 24 hour   Intake 2888 ml   Output 3250 ml   Net -362 ml       A/O x 3  calm  Lung CTA  CV  reg tachy  abd soft +bs  Ext no edema      LABS:    Lab R

## 2020-05-22 NOTE — PROGRESS NOTES
[facility]    Progress Note    Michaeljavier Trivedilázaro Patient Status:  Inpatient    1970 MRN [id number]   Location [facility] 3W/SW [facility] Shemar Corrigan         Assessment and Plan: lesions    Scheduled Meds:    • nicotine  1 patch Transdermal Daily   • cloNIDine  1 patch Transdermal Weekly   • gabapentin  300 mg Oral TID   • QUEtiapine Fumarate ER  150 mg Oral QPM   • mupirocin  1 Application Each Nare BID   • piperacillin-tazobactam

## 2020-05-22 NOTE — PROGRESS NOTES
Remy Vanegas is a 47year old   female lives with a friend with history of depression, borderline personality, ADHD and OCD who brought to the emergency room after patient found unresponsive by her ex-.   The patient seen today via factor in her current condition. Patient advised to focus on the current measurement and treatment and current present condition but believing that she can make difference in her journey.     HISTORY:  Past Medical History:   Diagnosis Date   • Depression [Lamotrigi*    ANAPHYLAXIS  Sulfa Antibiotics       HIVES    Laboratory Data:  Lab Results   Component Value Date    WBC 8.3 05/19/2020    HGB 9.4 (L) 05/19/2020    HCT 29.5 (L) 05/19/2020    .0 05/19/2020    CREATSERUM 0.70 05/20/2020    BUN 7 05/2 recently and having suicidal ideation and then overdose on heroin without full conscious awareness of the risk but was intentional and she was depressed and hopeless and helpless indicate the patient with high risk with the need for inpatient admission for (14)      Potassium      Lipid Panel      CBC With Differential With Platelet      Rapid SARS-CoV-2 by PCR STAT      Emergency MRSA Screen by PCR STAT      Urine Culture, Routine Once      SARS-CoV-2 by PCR Once      Meds This Visit:  Requested Prescriptio

## 2020-05-23 NOTE — PLAN OF CARE
Problem: Patient Centered Care  Goal: Patient preferences are identified and integrated in the patient's plan of care  Description  Interventions:  - What would you like us to know as we care for you?  I have a son named irena  - Provide timely, complete psychosocial needs and initiate Spiritual Care or Behavioral Health consult as needed  Outcome: Progressing     Problem: DECISION MAKING  Goal: Pt/Family able to effectively weigh alternatives and participate in decision making related to treatment and car HARM  Goal: Patient will be protected from self-harm  Description  INTERVENTIONS:  - Initiate Suicide Precautions, including constant direct observation by a care companion, sitter or RN  - Initiate consults as appropriate with [employer], Spiritual feels balance and connection with higher power that empowers the self during times of loss, guilt and fear  Description  INTERVENTIONS:  - Create safety for patient through empathic presence and non-judgmental listening  - Encourage patient to explore his/ Identify cognitive and physical deficits and behaviors that affect risk of falls.   - [place] fall precautions as indicated by assessment.  - Educate pt/family on patient safety including physical limitations  - Instruct pt to call for assistance with act strategies to overcome with those who interact with patient  Outcome: Progressing     Problem: RESPIRATORY - ADULT  Goal: Achieves optimal ventilation and oxygenation  Description  INTERVENTIONS:  - Assess for changes in respiratory status  - Assess for ch

## 2020-05-23 NOTE — PLAN OF CARE
Sitter remains at bedside. Pt received 500 cc bolus for hypotension.  Lasix held in AM.     Problem: Patient Centered Care  Goal: Patient preferences are identified and integrated in the patient's plan of care  Description  Interventions:  - What would you and initiate Spiritual Care or Behavioral Health consult as needed  Outcome: Progressing     Problem: DECISION MAKING  Goal: Pt/Family able to effectively weigh alternatives and participate in decision making related to treatment and care  Description  INT be protected from self-harm  Description  INTERVENTIONS:  - Initiate Suicide Precautions, including constant direct observation by a care companion, sitter or RN  - Initiate consults as appropriate with Behavioral Health, Spiritual Care  - Evaluate care se connection with higher power that empowers the self during times of loss, guilt and fear  Description  INTERVENTIONS:  - Create safety for patient through empathic presence and non-judgmental listening  - Encourage patient to explore his/her values, belief and physical deficits and behaviors that affect risk of falls.   - P[place] fall precautions as indicated by assessment.  - Educate pt/family on patient safety including physical limitations  - Instruct pt to call for assistance with activity based on asse with those who interact with patient  Outcome: Progressing     Problem: RESPIRATORY - ADULT  Goal: Achieves optimal ventilation and oxygenation  Description  INTERVENTIONS:  - Assess for changes in respiratory status  - Assess for changes in mentation and

## 2020-05-23 NOTE — PROGRESS NOTES
[facility] - [facility]    Cardiology Progress Note    Trevor Solis Patient Status:  Inpatient    1970 MRN [id number]   Location [facility] 3W/SW [facility] PCP Andreia Guzman     Subjective:    Ohara with any further questions.     Results:     Lab Results   Component Value Date    WBC 10.4 05/22/2020    HGB 12.3 05/22/2020    HCT 37.0 05/22/2020    .0 05/22/2020    CREATSERUM 0.70 05/20/2020    BUN 7 05/20/2020     05/20/2020    K 4.0 05/2

## 2020-05-23 NOTE — PROGRESS NOTES
Pt hypotensive, SBP in the 70s. Not really symptomativ just slight dizziness with change in position. Dr Saadia Jaime paged, ordered a 500vcc NS bolus. No parameters to call back for. Also gave order to hold lasix in AM,. Clonidine patch removed.

## 2020-05-23 NOTE — PROGRESS NOTES
[facility]    Progress Note    Margaret Rodriguez Patient Status:  Inpatient    1970 MRN [id number]   [facility] 3W/SW Attending [facility] Day # 6 PCP Hipolito Brownlee        Subjective:   Akhil Perera 05/17/2020    DDIMER 1.47 (H) 05/18/2020    CRP 6.89 (H) 05/18/2020    TROP <0.045 05/17/2020    ETOH <3 05/17/2020                        Betzaida Hernandez MD  5/23/2020

## 2020-05-23 NOTE — PROGRESS NOTES
[facility]    Progress Note    Priscila Martinez Patient Status:  Inpatient    1970 MRN [id number]   [facility] 3W/SW Attending Rina Guaman [facility] Day # 6 PCP Maribel Thrasher       Subjective:   Eli Con Nebulization, Q6H PRN  clonazePAM (KLONOPIN) tab 0.5 mg, 0.5 mg, Oral, BID PRN  Heparin Sodium (Porcine) 5000 UNIT/ML injection 5,000 Units, 5,000 Units, Subcutaneous, Q8H [address]          Results:     Lab Results   Component Value Date    WBC 10.4 05/22/2020

## 2020-05-23 NOTE — PLAN OF CARE
Te Metz is hopeful of being discharged to the inpatient psych facility today. Medically cleared per cardiology and per Dr. Jesus [address]. Awaiting coordination of placement per Metro Schaumann (liaison).

## 2020-05-24 ENCOUNTER — APPOINTMENT (OUTPATIENT)
Dept: CT IMAGING | Facility: HOSPITAL | Age: 50
DRG: 917 | End: 2020-05-24
Attending: INTERNAL MEDICINE
Payer: MEDICAID

## 2020-05-24 PROCEDURE — 71250 CT THORAX DX C-: CPT | Performed by: INTERNAL MEDICINE

## 2020-05-24 NOTE — BH PROGRESS NOTE
Chart reviewed, spoke with RN, interviewed pt. Pt admitted with reported intentional heroin OD, in suicide attempt, in ex-'s home while visiting her son. She reported to med that son witnessed her OD, and arrest from aspiration.          Pt completed.

## 2020-05-24 NOTE — BH PROGRESS NOTE
[facility] - claims to still have not gotten a packet  [facility] - does not have any female beds

## 2020-05-24 NOTE — PLAN OF CARE
Problem: ANXIETY  Goal: Will report anxiety at manageable levels  Description  INTERVENTIONS:  - Administer medication as ordered  - Teach and rehearse alternative coping skills  - Provide emotional support with 1:1 interaction with staff  Outcome: Progr Implement non-pharmacological measures as appropriate and evaluate response  - Consider cultural and social influences on pain and pain management  - Manage/alleviate anxiety  - Utilize distraction and/or relaxation techniques  - Monitor for opioid side ef

## 2020-05-24 NOTE — BH PROGRESS NOTE
[facility] has no appropriate bed. [facility] and [facility].

## 2020-05-24 NOTE — BH PROGRESS NOTE
Placement:    [facility]- no appropriate bed    [facility]- no beds currently available    [facility]- no beds currently available    [facility]- [facility] and was told there is a potential bed available at [address] [place] [address] in [place].  Advised to refax packet since first packet w

## 2020-05-24 NOTE — BH PROGRESS NOTE
[facility] - gave clinical. They will fax me a COVID screening and once I have that I will fax the packet.

## 2020-05-24 NOTE — PLAN OF CARE
Penelope Stevens is participating in care, discussed medications, anxiety management, possible discharge to inpatient psych [facility]   Problem: Patient Centered Care  Goal: Patient preferences are ident patient/family in relaxation techniques, as appropriate  - Assess for spiritual and psychosocial needs and initiate Spiritual Care or Behavioral Health consult as needed  Outcome: Progressing     Problem: DECISION MAKING  Goal: Pt/Family able to effectivel Psychosocial CNS, Spiritual Care as appropriate  Outcome: Progressing     Problem: SELF HARM  Goal: Patient will be protected from self-harm  Description  INTERVENTIONS:  - Initiate Suicide Precautions, including constant direct observation by a care joseph counseling and/or to [facility] for further support work  Outcome: Progressing  Goal: Pt feels balance and connection with higher power that empowers the self during times of loss, guilt and fear  Description  INTERVENTIONS:  - Create safety for patie Free from fall injury  Description  INTERVENTIONS:  - Assess pt frequently for physical needs  - Identify cognitive and physical deficits and behaviors that affect risk of falls.   - [place] fall precautions as indicated by assessment.  - Educate pt/famil for assistance (call light)  - Provide an  as needed  - Communicate barriers and strategies to overcome with those who interact with patient  Outcome: Progressing     Problem: RESPIRATORY - ADULT  Goal: Achieves optimal ventilation and oxygenati

## 2020-05-24 NOTE — PLAN OF CARE
Ilana Harrison has approval for transfer to [facility] in [place] per [facility]). Report and Discharge instructions reviewed with Mckenna.      Problem: Patient Centered Care  Goal: Patient preferences are identified and integrated in the patient's plan of Problem: COPING  Goal: Pt/Family able to verbalize concerns and demonstrate effective coping strategies  Description  INTERVENTIONS:  - Assist patient/family to identify coping skills, available support systems and cultural and spiritual values  - Provid for search of the patient and/or belongings  - Encourage verbalization of thoughts and concerns in a socially appropriate manner  - Utilize positive, consistent limit setting strategies supporting safety of patient, staff and others  - Encourage participat power  Description  INTERVENTIONS:  - Assist patient to overcome blocks to healing by use of spiritual practices (prayer, meditation, guided imagery, reiki, breath work, etc.)  - De-myth guilt and help patient/family identify possible irrational spiritual/ prayer/ritual/sacrament/ceremony  5/24/2020 1834 by Arnaldo Cornejo RN  Outcome: Adequate for Discharge  5/24/2020 2424 by Arnaldo Cornejo RN  Outcome: Progressing     Problem: PAIN - ADULT  Goal: Verbalizes/displays adequate comfort level or patient assist with strengthening/mobility  - Encourage toileting schedule  5/24/2020 1834 by Bryan Gomez RN  Outcome: Adequate for Discharge  5/24/2020 0931 by rByan Gomez RN  Outcome: Progressing     Problem: DISCHARGE PLANNING  Goal: Discharge to RN  Outcome: Progressing     Problem: RESPIRATORY - ADULT  Goal: Achieves optimal ventilation and oxygenation  Description  INTERVENTIONS:  - Assess for changes in respiratory status  - Assess for changes in mentation and behavior  - Position to facilitate

## 2020-05-24 NOTE — PROGRESS NOTES
[facility]    Progress Note    Vickiluis felipe Andreadon Patient Status:  Inpatient    1970 MRN [id number]   [facility] 3W/SW Attending Kailee Lopez [facility] Day # 7 PCP Leanne Velazquez       Subjective:   Eli Con 0.5 mg, Oral, BID PRN  Heparin Sodium (Porcine) 5000 UNIT/ML injection 5,000 Units, 5,000 Units, Subcutaneous, Q8H [facility]          Results:     Lab Results   Component Value Date    WBC 10.4 05/22/2020    HGB 12.3 05/22/2020    HCT 37.0 05/22/2020    .

## 2020-05-25 NOTE — BH PROGRESS NOTE
Per [employer] [place], patient's petition and certificate from 7/71/18 is valid until expiration time on documents on 5/26/20 as today, Monday 5/25/20 is a holiday.  Patient can be transported from [facility] to [address] [place] [address] with petition and certific

## 2020-05-25 NOTE — PLAN OF CARE
Patient alert and oriented, anxious throughout the day with PRN medication given. No shortness of breath on room air. Sitter utilized, room checked for potentially dangerous objects.       Problem: Patient Centered Care  Goal: Patient preferences are identi patient/family in relaxation techniques, as appropriate  - Assess for spiritual and psychosocial needs and initiate Spiritual Care or Behavioral Health consult as needed  Outcome: Progressing     Problem: SELF HARM  Goal: Patient will be protected from shanon physical limitations  - Instruct pt to call for assistance with activity based on assessment  - Modify environment to reduce risk of injury  - Provide assistive devices as appropriate  - Consider OT/PT consult to assist with strengthening/mobility  - Encou oxygenation  Description  INTERVENTIONS:  - Assess for changes in respiratory status  - Assess for changes in mentation and behavior  - Position to facilitate oxygenation and minimize respiratory effort  - Oxygen supplementation based on oxygen saturation

## 2020-05-25 NOTE — PROGRESS NOTES
[facility]    Progress Note    Adrienne Comp Patient Status:  Inpatient    1970 MRN [id number]   [facility] 3W/SW Attending [facility] Day # 8 PCP Guerline Bruce       Subjective:   Eli Con Units, Subcutaneous, Q8H [facility]          Results:     Lab Results   Component Value Date    WBC 10.4 05/22/2020    HGB 12.3 05/22/2020    HCT 37.0 05/22/2020    .0 05/22/2020    CREATSERUM 0.70 05/20/2020    BUN 7 05/20/2020     05/20/2020    K 4 SQ     Nicotine dependence   On Nicotine patch     D/w Nursing staff    patient is medically cleared for discharge   Awaiting COVID 19 testing   Awaiting transfer to psych     Marino Leon MD

## 2020-05-26 NOTE — BH PROGRESS NOTE
Addendum at 1245pm  Psych Liaison spoke with Vicente Montanez from [facility] [employer] who reported that [employer] was deflected at [facility], [address], and [address] d/t feeling that since it's been a week since her admission she meets more criteria for a dual

## 2020-05-26 NOTE — PROGRESS NOTES
Per Kiana Sorensen at [facility], [address] at [place] no longer has an available bed available for patient. A potential bed is available at [facility] pending review and updated certificate and petition.    According to Kiana Sorensen, per [facility] protocol updated do

## 2020-05-26 NOTE — PLAN OF CARE
Patient discharged on 5/26 at 1:45 pm. Patient alert and oriented. Belongings gathered and sent with patient. Discharge instructions given. Prescriptions given to patient. No tele monitor or IV. Patient discharged home.

## 2020-05-26 NOTE — PLAN OF CARE
Problem: ANXIETY  Goal: Will report anxiety at manageable levels  Description  INTERVENTIONS:  - Administer medication as ordered  - Teach and rehearse alternative coping skills  - Provide emotional support with 1:1 interaction with staff  Outcome: Progr standby assist, sitter at bedside, prn anxiety meds, new inpt form signed per Dr. Saurabh Gastelum, will need to have new petition form signed by psych liason in am.  Will continue to monitor for now.

## 2020-05-26 NOTE — PLAN OF CARE
Problem: Patient Centered Care  Goal: Patient preferences are identified and integrated in the patient's plan of care  Description  Interventions:  - What would you like us to know as we care for you?  I have a son named irena  - Provide timely, complete Progressing     Problem: DECISION MAKING  Goal: Pt/Family able to effectively weigh alternatives and participate in decision making related to treatment and care  Description  INTERVENTIONS:  - Determine when there are differences between patient's view, f Precautions, including constant direct observation by a care companion, sitter or RN  - Initiate consults as appropriate with Behavioral Health, Spiritual Care  - Evaluate care setting prior to admitting patient removing all contraband  - Remove all person fear  Description  INTERVENTIONS:  - Create safety for patient through empathic presence and non-judgmental listening  - Encourage patient to explore his/her values, beliefs and/or spiritual images and practices  - Encourage use of breath work, imagery, me precautions as indicated by assessment.  - Educate pt/family on patient safety including physical limitations  - Instruct pt to call for assistance with activity based on assessment  - Modify environment to reduce risk of injury  - Provide assistive device - ADULT  Goal: Achieves optimal ventilation and oxygenation  Description  INTERVENTIONS:  - Assess for changes in respiratory status  - Assess for changes in mentation and behavior  - Position to facilitate oxygenation and minimize respiratory effort  - Ox

## 2020-05-27 NOTE — PAYOR COMM NOTE
--------------  DISCHARGE REVIEW    Payor: [employer] #:  [id number]  Authorization Number: [id number]    Admit date: 5/17/20  Admit time:  0820  Discharge Date: 5/26/2020  1:38 PM     Admitting Physician: Rhea Pickard MD  Attending P

## 2020-07-06 NOTE — DISCHARGE SUMMARY
[facility]    PATIENT'S NAME: Agatha Gama   ATTENDING PHYSICIAN: Berta Strickland MD   PATIENT ACCOUNT#:   [id number]    LOCATION:  [address] [place] [address] #:   [id number]       YOB: 1970  ADMISSION DATE:       05/17/20 arranged for evaluation at the [address] [place] [address] the next day in the morning and to follow up with PCP in 1 week.     Dictated By Debby Yanez MD  d: 07/05/2020 10:39:29  t: 07/05/2020 19:23:17  Job [id number]  MM/

## 2021-10-26 NOTE — PROGRESS NOTES
Emiliano Gomes is a 47year old   female lives with a friend with history of depression, borderline personality, ADHD and OCD who brought to the emergency room after patient found unresponsive by her ex-.   The patient seen today via history.    Social History: Social History    Tobacco Use      Smoking status: Current Every Day Smoker        Packs/day: 0.00      Smokeless tobacco: Current User    Alcohol use: Not on file    Drug use: Not on file       Medications (Active prior to today before. Otherwise the patient today reporting feeling somewhat anxious and restless for being in the hospital for so long and she feel safe and confident to go home and treated as outpatient.   Patient found genuine and reliable in her presentation and req Activated      Pro Beta Natriuretic Peptide      Lactic Acid, Plasma      Lactic Acid 3 Hr Post Positive      HCG, Beta Subunit (Quant Pregnancy Test)      Urinalysis with Culture Reflex STAT      Drug Screen 7 W/confirmation, urine Once      Amphetamine C declines

## 2023-10-17 NOTE — PROGRESS NOTES
[facility]    Progress Note    Priscila Martinez Patient Status:  Inpatient    1970 MRN [id number]   [facility] 3W/SW Attending Rina Handley[address] [place] [address] [place] [facility] PCP Maribel Thrasher       Subjective:   Eli Con Occupational Therapy    OT Treatment    Patient Name: Zack Figueroa  MRN: [id number]  Today's Date: 10/17/2023  Time Calculation  Start Time: 1007  Stop Time: 1032  Time Calculation (min): 25 min         Assessment:  End of Session Communication: Bedside nurse  End of Session Patient Position: Up in chair, Alarm on       Plan:  OT Frequency: 5 times per week  OT Discharge Recommendations: High intensity level of continued care     Subjective     Current Problem:  1. Intraparenchymal hemorrhage of brain (CMS/[facility])  Critical Care    Critical Care      2. Oral phase dysphagia [R13.11]        3. Dysarthria as late effect of cerebellar cerebrovascular accident (CVA) [I69.322]        4. A-fib (CMS/[facility])        5. Anticoagulant long-term use            General:  OT Received On: 10/17/23  Family/Caregiver Present: Yes  Prior to Session Communication: Bedside nurse  Patient Position Received: up in chair      Pain:  Pain Assessment  Pain Assessment: 0-10  Pain Score: 0 - No pain  Objective                  Transfers  Transfer: Yes  Transfer 1  Transfer From 1: Sit to  Transfer to 1: Stand  Transfer Device 1: Walker, Gait belt  Transfer Level of Assistance 1: Moderate assistance  Trials/Comments 1: Pt completed multiple sit<>stands from chair to promote strength, balance and proper technique. R lean, cues for weight shifting.              Therapy/Activity: Therapeutic Exercise  Therapeutic Exercise Performed: Yes  Therapeutic Exercise Activity 1:  (B UE ther ex within all functional planes)                Outcome Measures:[facility] Daily Activity  Putting on and taking off regular lower body clothing: A lot  Bathing (including washing, rinsing, drying): A lot  Putting on and taking off regular upper body clothing: A lot  Toileting, which includes using toilet, bedpan or urinal: A lot  Taking care of personal grooming such as brushing teeth: A little  Eating Meals: A little  Daily Activity - Total Score: 14  Education  % 100 mL ADD-vantage, 3.375 g, Intravenous, Q8H  ipratropium-albuterol (DUONEB) nebulizer solution 3 mL, 3 mL, Nebulization, Q6H PRN  ARIPiprazole (ABILIFY) tab 2 mg, 2 mg, Oral, BID  clonazePAM (KLONOPIN) tab 0.5 mg, 0.5 mg, Oral, BID PRN  Heparin Sodium Documentation  ADL Training, taught by BELEN Cannon at 10/17/2023  3:32 PM.  Learner: Patient  Readiness: Acceptance  Method: Demonstration  Response: Needs Reinforcement, Demonstrated Understanding    Education Comments  No comments found.        EDUCATION:  Education  Individual(s) Educated: Patient  Education Provided: Ergonomics and postural realignment, Fall precautons, POC discussed and agreed upon  Patient Response to Education: Patient/Caregiver Verbalized Understanding of Information  Education Comment: recpetive to recommendations, will require continued education    Goals:  Encounter Problems       Encounter Problems (Active)       OT Goals       Patient will complete functional transfers at CGA level with LRD to facilitate increased independence and safety with home going  (Progressing)       Start:  10/10/23    Expected End:  10/21/23            Patient will complete functional mobility for household distances at CGA level with LRD to facilitate increased independence and safety with home going  (Progressing)       Start:  10/10/23    Expected End:  10/21/23            Patient will complete LB dressing at CGA level to facilitate safety and independence for home going   (Progressing)       Start:  10/10/23    Expected End:  10/21/23            Patient will complete toileting at CGA level to facilitate safety and independence for home going   (Progressing)       Start:  10/10/23    Expected End:  10/21/23            Patient will complete UB dressing at supervision level to facilitate safety and independence for home going   (Progressing)       Start:  10/10/23    Expected End:  10/21/23               Safety       LTG - Patient will adhere to hip precautions during ADL's and transfers       Start:  10/10/23    Expected End:  10/21/23            LTG - Patient will demonstrate safety requirements appropriate to situation/environment       Start:  10/10/23    Expected End:  10/21/23             LTG - Patient will utilize safety techniques       Start:  10/10/23    Expected End:  10/21/23            STG - Patient locks brakes on wheelchair       Start:  10/10/23    Expected End:  10/21/23            STG - Patient uses call light consistently to request assistance with transfers       Start:  10/10/23    Expected End:  10/21/23            STG - Patient uses gait belt during all transfers       Start:  10/10/23    Expected End:  10/21/23            Goal 1       Start:  10/10/23    Expected End:  10/21/23            Goal 2       Start:  10/10/23    Expected End:  10/21/23            Goal 3       Start:  10/10/23    Expected End:  10/21/23
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
## (undated) NOTE — ED AVS SNAPSHOT
Adrienne Caldwell   MRN: [id number]    Department:  [facility] Emergency Department   Date of Visit:  11/1/2019           Disclosure     Insurance plans vary and the physician(s) referred by the ER may not be covered by your plan.  Please contact y CARE PHYSICIAN AT ONCE OR RETURN IMMEDIATELY TO THE EMERGENCY DEPARTMENT. If you have been prescribed any medication(s), please fill your prescription right away and begin taking the medication(s) as directed.   If you believe that any of the medications